# Patient Record
Sex: FEMALE | Race: WHITE | Employment: OTHER | URBAN - METROPOLITAN AREA
[De-identification: names, ages, dates, MRNs, and addresses within clinical notes are randomized per-mention and may not be internally consistent; named-entity substitution may affect disease eponyms.]

---

## 2017-11-26 ENCOUNTER — TELEPHONE (OUTPATIENT)
Dept: INTERNAL MEDICINE CLINIC | Facility: CLINIC | Age: 72
End: 2017-11-26

## 2017-11-27 NOTE — TELEPHONE ENCOUNTER
Call patient. 1. Last OV here in our practice was in 2015.  2. Is she still a patient in our practice? If YES, then she's due for Medicare AWV or [place] Supervisit depending on her insurance.   If NO, then please document as such and then submit to Jyoti fo